# Patient Record
Sex: MALE | Employment: FULL TIME | URBAN - METROPOLITAN AREA
[De-identification: names, ages, dates, MRNs, and addresses within clinical notes are randomized per-mention and may not be internally consistent; named-entity substitution may affect disease eponyms.]

---

## 2022-08-09 ENCOUNTER — OFFICE VISIT (OUTPATIENT)
Dept: ORTHOPEDIC SURGERY | Age: 57
End: 2022-08-09
Payer: COMMERCIAL

## 2022-08-09 VITALS — BODY MASS INDEX: 29.16 KG/M2 | HEIGHT: 73 IN | WEIGHT: 220 LBS

## 2022-08-09 DIAGNOSIS — M75.82 ROTATOR CUFF TENDINITIS, LEFT: ICD-10-CM

## 2022-08-09 DIAGNOSIS — G89.29 CHRONIC LEFT SHOULDER PAIN: Primary | ICD-10-CM

## 2022-08-09 DIAGNOSIS — M25.512 CHRONIC LEFT SHOULDER PAIN: Primary | ICD-10-CM

## 2022-08-09 PROCEDURE — 99204 OFFICE O/P NEW MOD 45 MIN: CPT | Performed by: ORTHOPAEDIC SURGERY

## 2022-08-09 RX ORDER — CARVEDILOL 12.5 MG/1
TABLET ORAL
COMMUNITY
Start: 2022-06-24

## 2022-08-09 RX ORDER — SACUBITRIL AND VALSARTAN 24; 26 MG/1; MG/1
TABLET, FILM COATED ORAL
COMMUNITY
Start: 2022-05-23

## 2022-08-09 RX ORDER — MELOXICAM 15 MG/1
15 TABLET ORAL DAILY
Qty: 30 TABLET | Refills: 2 | Status: SHIPPED | OUTPATIENT
Start: 2022-08-09 | End: 2022-11-07

## 2022-08-09 NOTE — PROGRESS NOTES
Spring Pereira (: 1965) is a 49 y.o. male, patient, here for evaluation of the following chief complaint(s):  Shoulder Pain (Left, onset in May 2022)       HPI:    He began having increased left shoulder and arm pain in May 2022. He reports no specific injury and states his pain came on gradually. He describes his pain as moderate, throbbing, aching, and intermittent. He does report severe discomfort on 2022. His left shoulder and arm pain does make it difficult for him to go to sleep and does wake him up from sleep. The patient states that his pain continues to get worse. He reports that lying in bed makes his pain worse and heat and elevation makes pain better. The patient has been taking cyclobenzaprine and diclofenac for his discomfort as needed. He was not seen in the emergency room and reports no previous or related left shoulder or arm surgery. Not on File    Current Outpatient Medications   Medication Sig    Entresto 24-26 mg tablet     carvediloL (COREG) 12.5 mg tablet     meloxicam (Mobic) 15 mg tablet Take 1 Tablet by mouth in the morning for 90 days. No current facility-administered medications for this visit. History reviewed. No pertinent past medical history. History reviewed. No pertinent surgical history. History reviewed. No pertinent family history.      Social History     Socioeconomic History    Marital status:      Spouse name: Not on file    Number of children: Not on file    Years of education: Not on file    Highest education level: Not on file   Occupational History    Not on file   Tobacco Use    Smoking status: Not on file    Smokeless tobacco: Not on file   Substance and Sexual Activity    Alcohol use: Not on file    Drug use: Not on file    Sexual activity: Not on file   Other Topics Concern    Not on file   Social History Narrative    Not on file     Social Determinants of Health     Financial Resource Strain: Not on file   Food Insecurity: Not on file   Transportation Needs: Not on file   Physical Activity: Not on file   Stress: Not on file   Social Connections: Not on file   Intimate Partner Violence: Not on file   Housing Stability: Not on file       Review of Systems   All other systems reviewed and are negative. Vitals:  Ht 6' 1\" (1.854 m)   Wt 220 lb (99.8 kg)   BMI 29.03 kg/m²    Body mass index is 29.03 kg/m². Ortho Exam     The patient is well-developed and well-nourished. The patient presents today in alert and oriented x3 with a normal mood and affect. The patient stands with a normal weightbearing line and walks with a normal gait. Left shoulder is slightly tender to palpation over the anterior supraspinatus and proximal biceps. There is no palpable crepitus in the subacromial space with ranging. The patient has full range of motion, but there is some slight discomfort with above shoulder range of motion. The patient has very slight discomfort with impingement maneuvers and Whipple testing. The shoulder is stable on exam. They have 5/5 strength, and are neurovascularly intact distally. There is no erythema, warmth or skin lesions present. ASSESSMENT/PLAN:      1. Chronic left shoulder pain  -     XR SHOULDER LT AP/LAT MIN 2 V; Future  2. Rotator cuff tendinitis, left     XR Results (most recent):  Results from Appointment encounter on 08/09/22    XR SHOULDER LT AP/LAT MIN 2 V    Narrative  Left shoulder 3 view x-ray showed no evidence of a fracture or dislocation. Joint spaces are well-maintained. Below is the assessment and plan developed based on review of pertinent history, physical exam, labs, studies, and medications. We discussed the patient's left shoulder pain and his signs, symptoms, physical exam, description of his pain, and x-rays are consistent with mild rotator cuff tendinitis. The patient has full range of motion. His pain is intermittent and well controlled.   The possible treatment options were discussed with the patient and we elected to treat his pain with rest, ice, activity modification, and anti-inflammatory medication. The patient was given a prescription for meloxicam which he will use as needed and as directed. The patient will work on range of motion, strengthening, and stretching exercises with an at-home exercise program as pain tolerates. He may continue to participate in activities as tolerated. He has no restrictions. I will see him back in 4 weeks for reevaluation if he has any continued persistence of his pain however, if his pain continues to improve and is well-maintained I will see him back on an as-needed basis at which point we would discuss alternative treatment options. Return in about 4 weeks (around 9/6/2022), or if symptoms worsen or fail to improve. An electronic signature was used to authenticate this note.   -- Manda Begum MD

## 2022-08-09 NOTE — PROGRESS NOTES
Identified pt with two pt identifiers(name and ). Reviewed record in preparation for visit and have obtained necessary documentation. All patient medications has been reviewed. Chief Complaint   Patient presents with    Shoulder Pain     Left shoulder pain onset 2022 no know injury       No flowsheet data found. No flowsheet data found. Health Maintenance Due   Topic    Hepatitis C Screening     Depression Screen     COVID-19 Vaccine (1)    DTaP/Tdap/Td series (1 - Tdap)    Lipid Screen     Colorectal Cancer Screening Combo     Shingrix Vaccine Age 50> (1 of 2)     Health Maintenance Review: Patient reminded of \"due or due soon\" health maintenance. I have asked the patient to contact his/her primary care provider (PCP) for follow-up on his/her health maintenance. There were no vitals filed for this visit. Wt Readings from Last 3 Encounters:   No data found for Wt     Temp Readings from Last 3 Encounters:   No data found for Temp     BP Readings from Last 3 Encounters:   No data found for BP     Pulse Readings from Last 3 Encounters:   No data found for Pulse       1. \"Have you been to the ER, urgent care clinic since your last visit? Hospitalized since your last visit? \" No    2. \"Have you seen or consulted any other health care providers outside of the [address] [place] [address] since your last visit? \" No